# Patient Record
(demographics unavailable — no encounter records)

---

## 2025-01-24 NOTE — HISTORY OF PRESENT ILLNESS
[de-identified] : 5/22/2024 Small hiatal hernia, gastritis, few gastric polyps, erythematous duodenopathy (bx --> mild chronic duodenitis, focal gastric metaplasia, mild chronic gastritis, fundic gland polyp).  [FreeTextEntry1] : 1/18/2025 Numerous small nonenhancing cystic lesions and dilated sidebranches throughout at the pancreas, without significant change. The largest lesion measures 0.8 cm in the pancreatic head. The main pancreatic duct is of normal diameter.  Stable since prior study  [de-identified] : 10/7/2024 PET/CT 1. 1.9 cm focus of uptake, SUV max 5.4, within the left ventricle of the heart, along the lateral wall, concerning for a cardiac mass. Further evaluation with echocardiogram is recommended. 2. FDG avid left inguinal node, indeterminate in nature. Correlate for left lower extremity pathology. 3. Focal uptake overlying the right deltoid muscle adjacent to the right acromioclavicular joint, probably representing an inflammatory process such as bursitis. Correlate clinically. A neoplastic process is considered less likely.

## 2025-01-24 NOTE — ASSESSMENT
[FreeTextEntry1] : 63 yo female with hx of numerous sub-cm pancreatic cyst and elevated CA 19-9 that has resolved.  - Will recheck a CA 19-9 again today - MRCP again in 18 months to f/u on pancreatic cysts -- July 2026 - Obtain recent blood work from Dr. Keating's office - F/u in 18 months after MRCP completed

## 2025-07-18 NOTE — PHYSICAL EXAM
[Alert] : alert [No Acute Distress] : no acute distress [Sclera] : the sclera and conjunctiva were normal [Hearing Threshold Finger Rub Not [place]] : hearing was normal [Oropharynx] : the oropharynx was normal [Normal Appearance] : the appearance of the neck was normal [No Respiratory Distress] : no respiratory distress [Heart Rate And Rhythm] : heart rate was normal and rhythm regular [None] : no edema [Abdomen Tenderness] : non-tender [No Masses] : no abdominal mass palpated [Abdomen Soft] : soft [Abnormal Walk] : normal gait [Normal Color / Pigmentation] : normal skin color and pigmentation [] : no rash [No Focal Deficits] : no focal deficits [Oriented To Time, Place, And Person] : oriented to person, place, and time [Ascites: ___] : no ascites [Rebound Tenderness] : no rebound tenderness

## 2025-07-18 NOTE — HISTORY OF PRESENT ILLNESS
[FreeTextEntry1] : 65 yo female with hx of numerous sub-cm pancreatic cyst and elevated CA 19-9 that has resolved. No change in weight, appetite is fantastic. No N/V/D, intermittent constipation. More stress in her life because she moved to [place] from [place]. Some reflux and belching. No heartburn. No BRBPR, no dark stools. Pt had a cardiology wkup of her heart and no mass was found. Pt last had a left inguinal LN biopsy that was negative for malignant cells on . l. Pt had a CA 19-9 checked about a month ago -- nl at 28.  Since last visit has been doing well. Denies abd pain, N/V. Has some reflux described as belching which she takes domo candies for, denies heart burn though. Denies bloating, diarrhea/constipation. Reports daily BMs Denies blood in the stool, black stools. No weight loss, just a few lbs of weight gain.    No famhx of stomach or pancreatic cancer. No IBD. Mother - esophageal cancer,  at 64 yo Maternal Aunt -- esophageal cancer,  at 74-76 yo Maternal First Cousin -- colon cancer, living at 69 yo Sister -- pancreatic cysts   at [facility]   Gastroenterology Summary   Upper Endoscopy: 2024 Small hiatal hernia, gastritis, few gastric polyps, erythematous duodenopathy (bx --> mild chronic duodenitis, focal gastric metaplasia, mild chronic gastritis, fundic gland polyp).   Colonoscopy: 2024 Moderate diverticulosis of sigmoid colon, one 3 mm polyp in the cecum excised, internal hemorrhoids (bx --> minute TA).   Radiology Summary   MRI: 2025 Numerous small nonenhancing cystic lesions and dilated sidebranches throughout at the pancreas, without significant change. The largest lesion measures 0.8 cm in the pancreatic head. The main pancreatic duct is of normal diameter. Stable since prior study.   Other: 10/7/2024 PET/CT 1. 1.9 cm focus of uptake, SUV max 5.4, within the left ventricle of the heart, along the lateral wall, concerning for a cardiac mass. Further evaluation with echocardiogram is recommended. 2. FDG avid left inguinal node, indeterminate in nature. Correlate for left lower extremity pathology. 3. Focal uptake overlying the right deltoid muscle adjacent to the right acromioclavicular joint, probably representing an inflammatory process such as bursitis. Correlate clinically. A neoplastic process is considered less likely.

## 2025-07-18 NOTE — END OF VISIT
[] : Fellow [FreeTextEntry3] : Pt seen and d/w fellow.  Will check CA 19-9 and repeat MRCP to f/u on cysts in ONE year.

## 2025-07-18 NOTE — ASSESSMENT
[FreeTextEntry1] : 65 yo female with hx of numerous sub-cm pancreatic cyst and elevated CA 19-9 that has resolved.  5/24: egd/colon: duodenitis, focal gastric metaplasia, gastritis, tubular adenoma - Follows with Dr. Tavarez her primary gastroenterologist.  #elevated ca-19-9 - Will recheck a CA 19-9 again today - MRCP again in 12 months to f/u on pancreatic cysts -- July 2026 - recent labs reviewed  - F/u in 12 months after MRCP completed.  Patient seen and discussed with GI Attending Dr. Maru Becerra DO, PhD Gastroenterology Fellow [facility]